# Patient Record
Sex: MALE | Race: WHITE | HISPANIC OR LATINO | Employment: FULL TIME | URBAN - METROPOLITAN AREA
[De-identification: names, ages, dates, MRNs, and addresses within clinical notes are randomized per-mention and may not be internally consistent; named-entity substitution may affect disease eponyms.]

---

## 2021-01-29 DIAGNOSIS — Z23 ENCOUNTER FOR IMMUNIZATION: ICD-10-CM

## 2023-07-20 ENCOUNTER — HOSPITAL ENCOUNTER (OUTPATIENT)
Dept: RADIOLOGY | Facility: HOSPITAL | Age: 48
End: 2023-07-20
Payer: COMMERCIAL

## 2023-07-20 DIAGNOSIS — M19.079 LOCALIZED, PRIMARY OSTEOARTHRITIS OF ANKLE OR FOOT, UNSPECIFIED LATERALITY: ICD-10-CM

## 2023-07-20 DIAGNOSIS — M17.0 OSTEOARTHRITIS OF BOTH KNEES, UNSPECIFIED OSTEOARTHRITIS TYPE: ICD-10-CM

## 2023-07-20 PROCEDURE — 73610 X-RAY EXAM OF ANKLE: CPT

## 2025-02-11 ENCOUNTER — OFFICE VISIT (OUTPATIENT)
Dept: FAMILY MEDICINE CLINIC | Facility: CLINIC | Age: 50
End: 2025-02-11
Payer: COMMERCIAL

## 2025-02-11 VITALS
WEIGHT: 192.6 LBS | BODY MASS INDEX: 29.19 KG/M2 | OXYGEN SATURATION: 99 % | TEMPERATURE: 97.7 F | SYSTOLIC BLOOD PRESSURE: 128 MMHG | HEART RATE: 67 BPM | HEIGHT: 68 IN | DIASTOLIC BLOOD PRESSURE: 78 MMHG

## 2025-02-11 DIAGNOSIS — E29.1 HYPOGONADISM IN MALE: ICD-10-CM

## 2025-02-11 DIAGNOSIS — J00 ACUTE RHINITIS: Primary | ICD-10-CM

## 2025-02-11 DIAGNOSIS — J01.00 SUBACUTE MAXILLARY SINUSITIS: ICD-10-CM

## 2025-02-11 PROCEDURE — 99203 OFFICE O/P NEW LOW 30 MIN: CPT | Performed by: FAMILY MEDICINE

## 2025-02-11 RX ORDER — ANASTROZOLE 1 MG/1
1 TABLET ORAL DAILY
COMMUNITY
End: 2025-02-11

## 2025-02-11 RX ORDER — LEVOTHYROXINE SODIUM 25 UG/1
25 TABLET ORAL DAILY
COMMUNITY

## 2025-02-11 RX ORDER — TESTOSTERONE CYPIONATE 200 MG/ML
50 INJECTION, SOLUTION INTRAMUSCULAR EVERY OTHER DAY
COMMUNITY

## 2025-02-11 RX ORDER — FLUTICASONE PROPIONATE 50 MCG
1 SPRAY, SUSPENSION (ML) NASAL DAILY
Qty: 18 G | Refills: 5 | Status: SHIPPED | OUTPATIENT
Start: 2025-02-11

## 2025-02-11 RX ORDER — TADALAFIL 5 MG/1
5 TABLET ORAL DAILY PRN
COMMUNITY

## 2025-02-11 NOTE — PROGRESS NOTES
Assessment/Plan:    No problem-specific Assessment & Plan notes found for this encounter.       Diagnoses and all orders for this visit:    Acute rhinitis  -     fluticasone (FLONASE) 50 mcg/act nasal spray; 1 spray into each nostril daily    Hypogonadism in male    Other orders  -     calcium carbonate-vitamin D 250 mg-3.125 mcg per tablet; Take 1 tablet by mouth once a week  -     testosterone cypionate (DEPO-TESTOSTERONE) 200 mg/mL SOLN; Inject 50 mg into a muscle every other day  -     levothyroxine 25 mcg tablet; Take 25 mcg by mouth daily  -     tadalafil (CIALIS) 5 MG tablet; Take 5 mg by mouth daily as needed for erectile dysfunction  -     Discontinue: anastrozole (ARIMIDEX) 1 mg tablet; Take 1 mg by mouth daily          Subjective:   Chief Complaint   Patient presents with    Establish Care     Due PE        Patient ID: Kaden Villegas is a 49 y.o. male.    HPI    The following portions of the patient's history were reviewed and updated as appropriate: allergies, current medications, past family history, past medical history, past social history, past surgical history and problem list.    Review of Systems   Constitutional:  Negative for fatigue, fever and unexpected weight change.   HENT:  Negative for congestion, sinus pain and sore throat.    Eyes:  Negative for visual disturbance.   Respiratory:  Negative for shortness of breath and wheezing.    Cardiovascular:  Negative for chest pain and palpitations.   Gastrointestinal:  Negative for abdominal pain, nausea and vomiting.   Musculoskeletal: Negative.  Negative for arthralgias and myalgias.   Neurological:  Negative for syncope, weakness and numbness.   Psychiatric/Behavioral: Negative.  Negative for confusion, dysphoric mood and suicidal ideas.          Objective:  Vitals:    02/11/25 1026   BP: 128/78   BP Location: Left arm   Patient Position: Sitting   Cuff Size: Standard   Pulse: 67   Temp: 97.7 °F (36.5 °C)   TempSrc: Tympanic   SpO2: 99%  "  Weight: 87.4 kg (192 lb 9.6 oz)   Height: 5' 8\" (1.727 m)      Physical Exam  Constitutional:       General: He is not in acute distress.     Appearance: He is well-developed. He is not diaphoretic.   HENT:      Right Ear: Ear canal normal. Tympanic membrane is not injected.      Left Ear: Ear canal normal. Tympanic membrane is not injected.      Nose: Nose normal.      Mouth/Throat:      Pharynx: No oropharyngeal exudate.   Eyes:      Conjunctiva/sclera: Conjunctivae normal.      Pupils: Pupils are equal, round, and reactive to light.   Neck:      Thyroid: No thyromegaly.   Cardiovascular:      Rate and Rhythm: Normal rate and regular rhythm.      Heart sounds: Normal heart sounds. No murmur heard.  Pulmonary:      Effort: Pulmonary effort is normal. No respiratory distress.      Breath sounds: Normal breath sounds. No wheezing.   Abdominal:      General: Bowel sounds are normal.      Palpations: Abdomen is soft. There is no hepatomegaly, splenomegaly or mass.      Tenderness: There is no abdominal tenderness.   Musculoskeletal:         General: No tenderness or deformity. Normal range of motion.      Cervical back: Normal range of motion and neck supple.   Skin:     General: Skin is warm and dry.      Coloration: Skin is not pale.      Findings: No rash.   Neurological:      Mental Status: He is alert and oriented to person, place, and time. He is not disoriented.      Sensory: No sensory deficit.      Gait: Gait normal.   Psychiatric:         Speech: Speech normal.         Behavior: Behavior normal.         "